# Patient Record
Sex: MALE | Race: ASIAN | Employment: FULL TIME | URBAN - METROPOLITAN AREA
[De-identification: names, ages, dates, MRNs, and addresses within clinical notes are randomized per-mention and may not be internally consistent; named-entity substitution may affect disease eponyms.]

---

## 2020-09-26 ENCOUNTER — OFFICE VISIT - HEALTHEAST (OUTPATIENT)
Dept: FAMILY MEDICINE | Facility: CLINIC | Age: 30
End: 2020-09-26

## 2020-09-26 DIAGNOSIS — R06.2 WHEEZING: ICD-10-CM

## 2020-09-26 DIAGNOSIS — Z71.6 ENCOUNTER FOR TOBACCO USE CESSATION COUNSELING: ICD-10-CM

## 2020-09-26 RX ORDER — ALBUTEROL SULFATE 90 UG/1
2 AEROSOL, METERED RESPIRATORY (INHALATION) EVERY 6 HOURS PRN
Qty: 1 INHALER | Refills: 11 | Status: SHIPPED | OUTPATIENT
Start: 2020-09-26

## 2021-04-19 ENCOUNTER — IMMUNIZATION (OUTPATIENT)
Dept: NURSING | Facility: CLINIC | Age: 31
End: 2021-04-19
Payer: COMMERCIAL

## 2021-04-19 PROCEDURE — 91300 PR COVID VAC PFIZER DIL RECON 30 MCG/0.3 ML IM: CPT

## 2021-04-19 PROCEDURE — 0001A PR COVID VAC PFIZER DIL RECON 30 MCG/0.3 ML IM: CPT

## 2021-05-10 ENCOUNTER — IMMUNIZATION (OUTPATIENT)
Dept: NURSING | Facility: CLINIC | Age: 31
End: 2021-05-10
Attending: INTERNAL MEDICINE
Payer: COMMERCIAL

## 2021-05-10 PROCEDURE — 91300 PR COVID VAC PFIZER DIL RECON 30 MCG/0.3 ML IM: CPT

## 2021-05-10 PROCEDURE — 0002A PR COVID VAC PFIZER DIL RECON 30 MCG/0.3 ML IM: CPT

## 2021-06-05 VITALS
RESPIRATION RATE: 16 BRPM | OXYGEN SATURATION: 95 % | DIASTOLIC BLOOD PRESSURE: 88 MMHG | TEMPERATURE: 98.6 F | WEIGHT: 183.25 LBS | SYSTOLIC BLOOD PRESSURE: 131 MMHG | HEART RATE: 86 BPM

## 2021-06-11 NOTE — PROGRESS NOTES
OUTPATIENT VISIT NOTE                                                   Date of Visit: 9/26/2020     Chief Complaint   Chief Complaint   Patient presents with     Cough     x2wks, wheezing, runny nose         History of Present Illness   Marimar Asher is a 30 y.o. male with wife was sick a month ago with chills, sweats, no temp taken. Mild cough.Mild chest pain.  Stuffy nose. Sore throat. No ear pain. No stomach upset. No loss of taste or smell.  Other family members were sick with similar symptoms, but Patient was the most ill.   Patient improved.  Two weeks ago started coughing again, worse.  Cough is productive.  Mild shortness of breath at night and with coughing.  No chest pain.  No fever now.  No ear pain.  Nose is still congested. No stomach upset.  Hears a whistling sound when laying down, accompanied by shortness of breath.  Using dayquil at the start--not now.  Smokes 1/2 pack a day. Has cut back because of cough.  Drives a city bus.  Hasn't missed work. Most riders are wearing masks.  Haven't been to any large gatherings.     Review of Systems   A 10 point comprehensive review of systems was negative except as noted.      MEDICATIONS   No current outpatient medications on file prior to visit.     No current facility-administered medications on file prior to visit.          SOCIAL HISTORY   Social History     Tobacco Use     Smoking status: Current Every Day Smoker     Types: Cigarettes     Smokeless tobacco: Never Used   Substance Use Topics     Alcohol use: Not on file           Physical Exam   Vitals:    09/26/20 1257 09/26/20 1337   BP:  131/88   Patient Site:  Right Arm   Pulse:  86   Resp:  16   Temp:  98.6  F (37  C)   SpO2:  95%   Weight: 183 lb 4 oz (83.1 kg)         GENERAL:   Alert. Oriented.  EYES: Clear  HENT:  Ears: R TM pearly gray. Normal landmarks. L TM pearly gray.  Normal landmarks  Nose: Clear.  Sinuses: Nontender.  Oropharynx:  No erythema. No exudate.  NECK: Supple. No adenopathy.  LUNGS:  No rales. Diffuse expiratory and inspiratory wheezing with prolonged expiration  HEART: RRR  SKIN:  No rash.          Pertinent History     The following portions of the patient's history were reviewed and updated as appropriate: allergies, current medications, past family history, past medical history, past social history, past surgical history and problem list.     Assessment and Plan   1. Wheezing  In no acute respiratory distress but with significant wheezing on exam.  No rales but will treat with azithromycin for possible bacterial infection.  5 days prednisone.  Albuterol inhaler three times a day and prn.  If unable to stop completely within four weeks needs recheck.    Possibly had COVID as initial infection with flare up of wheezing following this due to inflammation.  Patient does not have known prior history of asthma, but states he had a lot of respiratory infections as child. Will check antibodies    - predniSONE (DELTASONE) 20 MG tablet; Take two tablets by mouth daily until gone..  Dispense: 10 tablet; Refill: 0  - azithromycin (ZITHROMAX) 250 MG tablet; Take two tablets po on day one then one tablet po q day on days 2-5.  Dispense: 6 tablet; Refill: 0  - COVID-19 Virus (Coronavirus) Antibody & Titer Reflex; Future  - Albuterol inhaler.    2. Encounter for tobacco use cessation counseling  Interested in quitting smoking.  Has quit for up to a month at a time.  Knows someone who has used chantix.  Instructions given.  Don't start until 7 days after completes above oral meds.  - varenicline (CHANTIX PAUL) 0.5 mg (11)- 1 mg (42) tablet; As Directed  Dispense: 53 tablet; Refill: 0  - varenicline (CHANTIX) 1 mg tablet; Take 1 tablet (1 mg total) by mouth 2 (two) times a day.  Dispense: 60 tablet; Refill: 1      Counseled regarding assessment and plan for evaluation and treatment. Questions were answered. See AVS for the specific written instructions that were provided at the conclusion of the visit.      Discussed signs / symptoms that warrant urgent / emergent medical attention.     Follow up prn.     Jessica Marte MD

## 2021-06-11 NOTE — PATIENT INSTRUCTIONS - HE
Azithromycin as directed.  Prednisone as directed.  Albuterol inhaler two puffs three times a day and as needed for 10 days, then either taper off or stop, but may continue to use inhaler as needed.   If unable to stop inhaler completely in four weeks, need to be rechecked.    Recheck promptly if worsening.    Recheck if no improvement in two weeks.    May start chantix one week after completely medications.  Plan to stop smoking one week after starting chantix.    Someone will call within a week to set up COVID antibody testing.      Smoking Cessation  Quitting smoking is important to your health and has many advantages. However, it is not always easy to quit since nicotine is a very addictive drug. Often times, people try 3 times or more before being able to quit. This document explains the best ways for you to prepare to quit smoking. Quitting takes hard work and a lot of effort, but you can do it.  ADVANTAGES OF QUITTING SMOKING    You will live longer, feel better, and live better.    Your body will feel the impact of quitting smoking almost immediately.    Within 20 minutes, blood pressure decreases. Your pulse returns to its normal level.    After 8 hours, carbon monoxide levels in the blood return to normal. Your oxygen level increases.    After 24 hours, the chance of having a heart attack starts to decrease. Your breath, hair, and body stop smelling like smoke.    After 48 hours, damaged nerve endings begin to recover. Your sense of taste and smell improve.    After 72 hours, the body is virtually free of nicotine. Your bronchial tubes relax and breathing becomes easier.    After 2 to 12 weeks, lungs can hold more air. Exercise becomes easier and circulation improves.    The risk of having a heart attack, stroke, cancer, or lung disease is greatly reduced.    After 1 year, the risk of coronary heart disease is cut in half.    After 5 years, the risk of stroke falls to the same as a nonsmoker.    After 10  years, the risk of lung cancer is cut in half and the risk of other cancers decreases significantly.    After 15 years, the risk of coronary heart disease drops, usually to the level of a nonsmoker.    If you are pregnant, quitting smoking will improve your chances of having a healthy baby.    The people you live with, especially any children, will be healthier.    You will have extra money to spend on things other than cigarettes.  QUESTIONS TO THINK ABOUT BEFORE ATTEMPTING TO QUIT  You may want to talk about your answers with your caregiver.    Why do you want to quit?    If you tried to quit in the past, what helped and what did not?    What will be the most difficult situations for you after you quit? How will you plan to handle them?    Who can help you through the tough times? Your family? Friends? A caregiver?    What pleasures do you get from smoking? What ways can you still get pleasure if you quit?  Here are some questions to ask your caregiver:    How can you help me to be successful at quitting?    What medicine do you think would be best for me and how should I take it?    What should I do if I need more help?    What is smoking withdrawal like? How can I get information on withdrawal?  GET READY    Set a quit date.    Change your environment by getting rid of all cigarettes, ashtrays, matches, and lighters in your home, car, or work. Do not let people smoke in your home.    Review your past attempts to quit. Think about what worked and what did not.  GET SUPPORT AND ENCOURAGEMENT  You have a better chance of being successful if you have help. You can get support in many ways.    Tell your family, friends, and co-workers that you are going to quit and need their support. Ask them not to smoke around you.    Get individual, group, or telephone counseling and support. Programs are available at local hospitals and health centers. Call your local health department for information about programs in your  "area.    Spiritual beliefs and practices may help some smokers quit.    Download a \"quit meter\" on your computer to keep track of quit statistics, such as how long you have gone without smoking, cigarettes not smoked, and money saved.    Get a self-help book about quitting smoking and staying off of tobacco.  LEARN NEW SKILLS AND BEHAVIORS    Distract yourself from urges to smoke. Talk to someone, go for a walk, or occupy your time with a task.    Change your normal routine. Take a different route to work. Drink tea instead of coffee. Eat breakfast in a different place.    Reduce your stress. Take a hot bath, exercise, or read a book.    Plan something enjoyable to do every day. Reward yourself for not smoking.    Explore interactive web-based programs that specialize in helping you quit.  GET MEDICINE AND USE IT CORRECTLY  Medicines can help you stop smoking and decrease the urge to smoke. Combining medicine with the above behavioral methods and support can greatly increase your chances of successfully quitting smoking.    Nicotine replacement therapy helps deliver nicotine to your body without the negative effects and risks of smoking. Nicotine replacement therapy includes nicotine gum, lozenges, inhalers, nasal sprays, and skin patches. Some may be available over-the-counter and others require a prescription.    Antidepressant medicine helps people abstain from smoking, but how this works is unknown. This medicine is available by prescription.    Nicotinic receptor partial agonist medicine simulates the effect of nicotine in your brain. This medicine is available by prescription.  Ask your caregiver for advice about which medicines to use and how to use them based on your health history. Your caregiver will tell you what side effects to look out for if you choose to be on a medicine or therapy. Carefully read the information on the package. Do not use any other product containing nicotine while using a nicotine " replacement product.   RELAPSE OR DIFFICULT SITUATIONS  Most relapses occur within the first 3 months after quitting. Do not be discouraged if you start smoking again. Remember, most people try several times before finally quitting. You may have symptoms of withdrawal because your body is used to nicotine. You may crave cigarettes, be irritable, feel very hungry, cough often, get headaches, or have difficulty concentrating. The withdrawal symptoms are only temporary. They are strongest when you first quit, but they will go away within 10 14 days.  To reduce the chances of relapse, try to:    Avoid drinking alcohol. Drinking lowers your chances of successfully quitting.    Reduce the amount of caffeine you consume. Once you quit smoking, the amount of caffeine in your body increases and can give you symptoms, such as a rapid heartbeat, sweating, and anxiety.    Avoid smokers because they can make you want to smoke.    Do not let weight gain distract you. Many smokers will gain weight when they quit, usually less than 10 pounds. Eat a healthy diet and stay active. You can always lose the weight gained after you quit.    Find ways to improve your mood other than smoking.  FOR MORE INFORMATION   www.smokefree.gov   Www.quitplan.com